# Patient Record
Sex: FEMALE
[De-identification: names, ages, dates, MRNs, and addresses within clinical notes are randomized per-mention and may not be internally consistent; named-entity substitution may affect disease eponyms.]

---

## 2018-09-07 ENCOUNTER — HOSPITAL ENCOUNTER (EMERGENCY)
Dept: HOSPITAL 31 - C.ER | Age: 38
Discharge: HOME | End: 2018-09-07
Payer: SELF-PAY

## 2018-09-07 VITALS
SYSTOLIC BLOOD PRESSURE: 116 MMHG | HEART RATE: 77 BPM | OXYGEN SATURATION: 98 % | DIASTOLIC BLOOD PRESSURE: 75 MMHG | RESPIRATION RATE: 17 BRPM | TEMPERATURE: 98.5 F

## 2018-09-07 VITALS — BODY MASS INDEX: 26.2 KG/M2

## 2018-09-07 DIAGNOSIS — M54.30: Primary | ICD-10-CM

## 2018-09-07 PROCEDURE — 99283 EMERGENCY DEPT VISIT LOW MDM: CPT

## 2018-09-07 NOTE — C.PDOC
History Of Present Illness


38-year-old female presents to the ED for evaluation of new onset right-sided 

lower back pain x 1 week. Patient states symptoms are worse with walking. She 

reports occasional radiating to right buttock/lateral right lateral thigh. She 

denies weakness/numbness or any other associated symptoms. She has not tried 

any pain medications. Denies trauma. 





new onset r LBP X 1 WK. WORSE W WALKING. +OCC RADIATION R BUTTOCK/LAT R THIGH. 

NO WEAK/NUMB, OTHER ASSOC SX. NO PAIN MEDS TRIED. NO TRAUMA





EXAM


MILD DIST


BACK LIMITED FULL EXTENSION. NONTEND


NEURO INTACT


GAIT WNL


REMAINDER NEG





MDM


SCIATICA


Time Seen by Provider: 09/07/18 09:12


Chief Complaint (Nursing): Back Pain


History Per: Patient


History/Exam Limitations: no limitations


Onset/Duration Of Symptoms: Other (1 week )


Current Symptoms Are (Timing): Still Present


Quality Of Discomfort: "Pain"


Previous Symptoms: Back Pain


Associated Symptoms: denies: New Weakness, New Numbness


Exacerbating Factor(s): Other (walking )


Additional History Per: Patient





Past Medical History


Reviewed: Historical Data, Nursing Documentation, Vital Signs


Vital Signs: 


 Last Vital Signs











Temp  98.5 F   09/07/18 08:53


 


Pulse  77   09/07/18 08:53


 


Resp  17   09/07/18 08:53


 


BP  116/75   09/07/18 08:53


 


Pulse Ox  98   09/07/18 09:21














- Medical History


PMH: No Chronic Diseases


Surgical History: Cholecystectomy


Family History: States: Unknown Family Hx





- Social History


Hx Tobacco Use: No


Hx Alcohol Use: No


Hx Substance Use: No





- Immunization History


Hx Tetanus Toxoid Vaccination:  (unk)


Hx Influenza Vaccination: No


Hx Pneumococcal Vaccination: No





Review Of Systems


Musculoskeletal: Positive for: Back Pain (right-sided, lower ), Other (right 

buttocks, right lateral thigh )





Physical Exam





- Physical Exam


Appears: Non-toxic, Other (in mild distress )


Skin: Normal Color, Warm, Dry


Head: Atraumatic, Normacephalic


Eye(s): bilateral: Normal Inspection


Oral Mucosa: Moist


Neck: Supple


Chest: Symmetrical, No Deformity, No Tenderness


Cardiovascular: Rhythm Regular, No Murmur


Respiratory: Normal Breath Sounds, No Rales, No Rhonchi, No Wheezing


Back: No Vertebral Tenderness, No Paraspinal Tenderness, Other (limited full 

extension)


Extremity: Normal ROM, Capillary Refill (less than 2 seconds )


Neurological/Psych: Oriented x3, Normal Speech, Normal Cognition





ED Course And Treatment


O2 Sat by Pulse Oximetry: 98 (on RA)


Pulse Ox Interpretation: Normal


Progress Note: Decadron PO, Flexeril PO, Motrin PO, Gapapentin PO, Tylenol PO 

given.





Medical Decision Making


Medical Decision Making: 


SCIATICA





Disposition


Counseled Patient/Family Regarding: Diagnosis, Need For Followup, Rx Given





- Disposition


Referrals: 


[facility] [Outside]


[facility] [Outside]


Disposition: HOME/ ROUTINE


Disposition Time: 09:20


Condition: IMPROVED


Prescriptions: 


Acetaminophen [Tylenol Extra Strength] 2 tab PO Q6 #30 tablet


Cyclobenzaprine [Flexeril] 10 mg PO TID #15 tab


Gabapentin [Neurontin] 300 mg PO TID #30 cap


Ibuprofen [Motrin] 600 mg PO Q6 #30 tab


Instructions:  Sciatica (DC)


Forms:  [employer] (English), Work Excuse


Print Language: Thai





- Clinical Impression


Clinical Impression: 


 Sciatica








- Scribe Statement


The provider has reviewed the documentation as recorded by the Scribe (Vansea Abernathy)


Provider Attestation: 








All medical record entries made by the Scribe were at my direction and 

personally dictated by me. I have reviewed the chart and agree that the record 

accurately reflects my personal performance of the history, physical exam, 

medical decision making, and the department course for this patient. I have 

also personally directed, reviewed, and agree with the discharge instructions 

and disposition.